# Patient Record
Sex: MALE | Race: WHITE | NOT HISPANIC OR LATINO | Employment: OTHER | URBAN - METROPOLITAN AREA
[De-identification: names, ages, dates, MRNs, and addresses within clinical notes are randomized per-mention and may not be internally consistent; named-entity substitution may affect disease eponyms.]

---

## 2017-09-27 ENCOUNTER — HOSPITAL ENCOUNTER (EMERGENCY)
Facility: HOSPITAL | Age: 82
Discharge: NURSING, REHAB OR LONG-TERM CARE | End: 2017-09-27
Attending: EMERGENCY MEDICINE | Admitting: EMERGENCY MEDICINE
Payer: COMMERCIAL

## 2017-09-27 VITALS
DIASTOLIC BLOOD PRESSURE: 94 MMHG | WEIGHT: 140 LBS | OXYGEN SATURATION: 95 % | TEMPERATURE: 97.9 F | RESPIRATION RATE: 18 BRPM | SYSTOLIC BLOOD PRESSURE: 166 MMHG | HEART RATE: 86 BPM

## 2017-09-27 DIAGNOSIS — F03.90 DEMENTIA (HCC): Primary | ICD-10-CM

## 2017-09-27 LAB
AMPHETAMINES SERPL QL SCN: NEGATIVE
BACTERIA UR QL AUTO: ABNORMAL /HPF
BARBITURATES UR QL: NEGATIVE
BENZODIAZ UR QL: NEGATIVE
BILIRUB UR QL STRIP: ABNORMAL
CAOX CRY URNS QL MICRO: ABNORMAL /HPF
CLARITY UR: CLEAR
COCAINE UR QL: NEGATIVE
COLOR UR: YELLOW
COLOR, POC: YELLOW
GLUCOSE UR STRIP-MCNC: NEGATIVE MG/DL
HGB UR QL STRIP.AUTO: ABNORMAL
HYALINE CASTS #/AREA URNS LPF: ABNORMAL /LPF
KETONES UR STRIP-MCNC: NEGATIVE MG/DL
LEUKOCYTE ESTERASE UR QL STRIP: ABNORMAL
METHADONE UR QL: NEGATIVE
NITRITE UR QL STRIP: NEGATIVE
NON-SQ EPI CELLS URNS QL MICRO: ABNORMAL /HPF
OPIATES UR QL SCN: NEGATIVE
PCP UR QL: NEGATIVE
PH UR STRIP.AUTO: 5.5 [PH] (ref 4.5–8)
PROT UR STRIP-MCNC: ABNORMAL MG/DL
RBC #/AREA URNS AUTO: ABNORMAL /HPF
SP GR UR STRIP.AUTO: >=1.03 (ref 1–1.03)
THC UR QL: NEGATIVE
UROBILINOGEN UR QL STRIP.AUTO: 4 E.U./DL
WBC #/AREA URNS AUTO: ABNORMAL /HPF

## 2017-09-27 PROCEDURE — 80307 DRUG TEST PRSMV CHEM ANLYZR: CPT | Performed by: EMERGENCY MEDICINE

## 2017-09-27 PROCEDURE — 81002 URINALYSIS NONAUTO W/O SCOPE: CPT | Performed by: EMERGENCY MEDICINE

## 2017-09-27 PROCEDURE — 99283 EMERGENCY DEPT VISIT LOW MDM: CPT

## 2017-09-27 PROCEDURE — 81001 URINALYSIS AUTO W/SCOPE: CPT

## 2017-09-27 RX ORDER — MELATONIN
2000 DAILY
COMMUNITY

## 2017-09-27 RX ORDER — FINASTERIDE 5 MG/1
5 TABLET, FILM COATED ORAL DAILY
COMMUNITY

## 2017-09-27 RX ORDER — CHOLECALCIFEROL (VITAMIN D3) 125 MCG
5 CAPSULE ORAL
COMMUNITY

## 2017-09-27 RX ORDER — HALOPERIDOL 0.5 MG/1
0.5 TABLET ORAL ONCE AS NEEDED
COMMUNITY

## 2017-09-27 RX ORDER — ATORVASTATIN CALCIUM 20 MG/1
20 TABLET, FILM COATED ORAL DAILY
COMMUNITY

## 2017-09-27 RX ORDER — SACCHAROMYCES BOULARDII 250 MG
250 CAPSULE ORAL DAILY
COMMUNITY

## 2017-09-27 RX ORDER — TAMSULOSIN HYDROCHLORIDE 0.4 MG/1
0.4 CAPSULE ORAL
COMMUNITY

## 2017-09-27 RX ORDER — ASPIRIN 81 MG/1
81 TABLET ORAL DAILY
COMMUNITY

## 2017-09-27 RX ORDER — MULTIVITAMIN
1 CAPSULE ORAL DAILY
COMMUNITY

## 2017-09-27 RX ORDER — QUETIAPINE FUMARATE 25 MG/1
25 TABLET, FILM COATED ORAL DAILY
COMMUNITY

## 2017-09-27 RX ORDER — ACETAMINOPHEN 325 MG/1
650 TABLET ORAL EVERY 6 HOURS PRN
COMMUNITY

## 2017-09-27 NOTE — ED PROVIDER NOTES
History  Chief Complaint   Patient presents with    Psychiatric Evaluation     Patient comes from locked dementia unit at [facility] and per staff patient has been combative, has been hiding in closets, and entering other patient's rooms; per staff pt  is not redirectable and they would like a staff eval for patient; pt  is unable to answer questions appropriately due to end stage dementia      This is an 75-year-old male with a history of dementia, Alzheimer's, hypertension who presents with violent behavior/altered mental status  The patient is currently alert and oriented x1 (only to himself)  He is not sure why is in the hospital   I spoke with [facility] staff, who state that he was only sent in for altered mental status  According to paperwork, he has been hiding in closet, going into other patient's rooms, and getting violent with the staff  He does come from a locked dementia unit at [facility]  According to the patient's wife, she corroborates the paperwork is states that he does have severe dementia and is usually pleasant at baseline  No other concerning symptoms including fever, complaints of dysuria, diarrhea, cough  The patient is sitting comfortably in the stretcher  He is wide awake and pleasantly confused  Denies fever/chills, nausea/vomiting, lightheadedness/dizziness, numbness/weakness, headache, change in vision, URI symptoms, neck pain, chest pain, palpitations, shortness of breath, cough, back pain, flank pain, abdominal pain, diarrhea, hematochezia, melena, dysuria, hematuria  Prior to Admission Medications   Prescriptions Last Dose Informant Patient Reported? Taking? Melatonin 5 MG TABS   Yes Yes   Sig: Take 5 mg by mouth daily at bedtime   Multiple Vitamin (MULTIVITAMIN) capsule   Yes Yes   Sig: Take 1 capsule by mouth daily   QUEtiapine (SEROquel) 25 mg tablet   Yes Yes   Sig: Take 25 mg by mouth daily Patient takes 1/2 tablet daily     acetaminophen (TYLENOL) 325 mg tablet   Yes Yes   Sig: Take 650 mg by mouth every 6 (six) hours as needed for mild pain   aspirin (ECOTRIN LOW STRENGTH) 81 mg EC tablet   Yes Yes   Sig: Take 81 mg by mouth daily   atorvastatin (LIPITOR) 20 mg tablet   Yes Yes   Sig: Take 20 mg by mouth daily   cholecalciferol (VITAMIN D3) 1,000 units tablet   Yes Yes   Sig: Take 2,000 Units by mouth daily   finasteride (PROSCAR) 5 mg tablet   Yes Yes   Sig: Take 5 mg by mouth daily   haloperidol (HALDOL) 0 5 mg tablet   Yes Yes   Sig: Take 0 5 mg by mouth once as needed for agitation   metoprolol tartrate (LOPRESSOR) 25 mg tablet   Yes Yes   Sig: Take 12 5 mg by mouth every 12 (twelve) hours   saccharomyces boulardii (FLORASTOR) 250 mg capsule   Yes Yes   Sig: Take 250 mg by mouth daily   tamsulosin (FLOMAX) 0 4 mg   Yes Yes   Sig: Take 0 4 mg by mouth daily with dinner      Facility-Administered Medications: None       Past Medical History:   Diagnosis Date    Acute kidney injury     Alzheimer's dementia with behavioral disturbance     BPH (benign prostatic hyperplasia)     Cardiac disease     Dementia     Falls frequently     Hyperlipidemia     Hypertension        History reviewed  No pertinent surgical history  History reviewed  No pertinent family history  I have reviewed and agree with the history as documented  Social History   Substance Use Topics    Smoking status: Unknown If Ever Smoked    Smokeless tobacco: Never Used    Alcohol use No        Review of Systems   Constitutional: Negative for chills, fatigue and fever  HENT: Negative for rhinorrhea, sore throat and trouble swallowing  Eyes: Negative for photophobia and visual disturbance  Respiratory: Negative for cough, chest tightness and shortness of breath  Cardiovascular: Negative for chest pain, palpitations and leg swelling  Gastrointestinal: Negative for abdominal pain, blood in stool, diarrhea, nausea and vomiting  Endocrine: Negative for polyuria  Genitourinary: Negative for dysuria, flank pain and hematuria  Musculoskeletal: Negative for back pain and neck pain  Skin: Negative for color change and rash  Allergic/Immunologic: Negative for immunocompromised state  Neurological: Negative for dizziness, weakness, light-headedness, numbness and headaches  All other systems reviewed and are negative  Physical Exam  ED Triage Vitals [09/27/17 1351]   Temperature Pulse Respirations Blood Pressure SpO2   97 9 °F (36 6 °C) 75 18 131/67 93 %      Temp Source Heart Rate Source Patient Position - Orthostatic VS BP Location FiO2 (%)   Oral Monitor Sitting Right arm --      Pain Score       No Pain           Physical Exam   Constitutional: Vital signs are normal  He appears well-developed and well-nourished  He is cooperative  No distress  HENT:   Mouth/Throat: Uvula is midline and oropharynx is clear and moist    Eyes: Conjunctivae, EOM and lids are normal  Pupils are equal, round, and reactive to light  Neck: Trachea normal  No thyroid mass and no thyromegaly present  Cardiovascular: Normal rate, regular rhythm, normal heart sounds, intact distal pulses and normal pulses  No murmur heard  Pulmonary/Chest: Effort normal and breath sounds normal    Abdominal: Soft  Normal appearance and bowel sounds are normal  There is no tenderness  There is no rebound, no guarding, no CVA tenderness and negative Mueller's sign  Neurological: He is alert  Alert and oriented x1 (to person)   Skin: Skin is warm, dry and intact  Psychiatric: He has a normal mood and affect   His speech is normal and behavior is normal  Thought content normal        ED Medications  Medications - No data to display    Diagnostic Studies  Labs Reviewed   URINE MICROSCOPIC - Abnormal        Result Value Ref Range Status    RBC, UA 2-4 (*) None Seen, 0-5 /hpf Final    WBC, UA 4-10 (*) None Seen, 0-5, 5-55, 5-65 /hpf Final    Hyaline Casts, UA 3-5 (*) None Seen /lpf Final    Ca Oxalate Renae, UA Moderate (*) None Seen /hpf Final    Epithelial Cells None Seen  None Seen, Occasional /hpf Final    Bacteria, UA Occasional  None Seen, Occasional /hpf Final   ED URINE MACROSCOPIC - Abnormal     Leukocytes, UA Trace (*) Negative Final    Protein,  (2+) (*) Negative mg/dl Final    Urobilinogen, UA 4 0 (*) 0 2, 1 0 E U /dl E U /dl Final    Bilirubin, UA Interference- unable to analyze (*) Negative Final    Comment: The dipstick result may be falsely positive do to interfering substances  We recommend reliance upon serum bilirubin, liver & kidney function tests to guide patient care if clinically indicated  Blood, UA Trace (*) Negative Final    Color, UA Yellow   Final    Clarity, UA Clear   Final    pH, UA 5 5  4 5 - 8 0 Final    Nitrite, UA Negative  Negative Final    Glucose, UA Negative  Negative mg/dl Final    Ketones, UA Negative  Negative mg/dl Final    Specific Gravity, UA >=1 030  1 003 - 1 030 Final    Narrative:     CLINITEK RESULT   RAPID DRUG SCREEN, URINE - Normal    Amph/Meth UR Negative  Negative Final    Barbiturate Ur Negative  Negative Final    Benzodiazepine Urine Negative  Negative Final    Cocaine Urine Negative  Negative Final    Methadone Urine Negative  Negative Final    Opiate Urine Negative  Negative Final    PCP Ur Negative  Negative Final    THC Urine Negative  Negative Final    Narrative:     FOR MEDICAL PURPOSES ONLY  IF CONFIRMATION NEEDED PLEASE CONTACT THE LAB WITHIN 5 DAYS      Drug Screen Cutoff Levels:  AMPHETAMINE/METHAMPHETAMINES  1000 ng/mL  BARBITURATES     200 ng/mL  BENZODIAZEPINES     200 ng/mL  COCAINE      300 ng/mL  METHADONE      300 ng/mL  OPIATES      300 ng/mL  PHENCYCLIDINE     25 ng/mL  THC       50 ng/mL   POCT URINALYSIS DIPSTICK - Normal    Color, UA yellow   Final       No orders to display       Procedures  Procedures      Phone Consults  ED Phone Contact    ED Course  ED Course                                MDM  Number of Diagnoses or Management Options  Dementia:   Diagnosis management comments: The patient is well-appearing and in no distress  He is at his baseline mental status  He is pleasant and non combative  We will discharge the patient back to the nursing home  CritCare Time    Disposition  Final diagnoses:   Dementia     ED Disposition     ED Disposition Condition Comment    Discharge  [facility]ron Sharp discharge to home/self care  Condition at discharge: Stable        Follow-up Information     Follow up With Specialties Details Why Contact Info Additional [address] Emergency Department Emergency Medicine Go to If symptoms worsen [address]  [phone]  ED, [address] [place] [address], [phone]    Primary Care Doctor    Follow up PCP as needed  Discharge Medication List as of 9/27/2017  5:22 PM      CONTINUE these medications which have NOT CHANGED    Details   acetaminophen (TYLENOL) 325 mg tablet Take 650 mg by mouth every 6 (six) hours as needed for mild pain, Historical Med      aspirin (ECOTRIN LOW STRENGTH) 81 mg EC tablet Take 81 mg by mouth daily, Historical Med      atorvastatin (LIPITOR) 20 mg tablet Take 20 mg by mouth daily, Historical Med      cholecalciferol (VITAMIN D3) 1,000 units tablet Take 2,000 Units by mouth daily, Historical Med      finasteride (PROSCAR) 5 mg tablet Take 5 mg by mouth daily, Historical Med      haloperidol (HALDOL) 0 5 mg tablet Take 0 5 mg by mouth once as needed for agitation, Historical Med      Melatonin 5 MG TABS Take 5 mg by mouth daily at bedtime, Historical Med      metoprolol tartrate (LOPRESSOR) 25 mg tablet Take 12 5 mg by mouth every 12 (twelve) hours, Historical Med      Multiple Vitamin (MULTIVITAMIN) capsule Take 1 capsule by mouth daily, Historical Med      QUEtiapine (SEROquel) 25 mg tablet Take 25 mg by mouth daily Patient takes 1/2 tablet daily  , Historical Med      saccharomyces boulardii (FLORASTOR) 250 mg capsule Take 250 mg by mouth daily, Historical Med      tamsulosin (FLOMAX) 0 4 mg Take 0 4 mg by mouth daily with dinner, Historical Med           No discharge procedures on file  ED Provider  Attending physically available and evaluated Ericka Forte I managed the patient along with the ED Attending      Electronically Signed by       Jessica Chnaey  Resident  09/28/17 2495

## 2017-09-27 NOTE — ED ATTENDING ATTESTATION
Siva Clemons DO, saw and evaluated the patient  I have discussed the patient with the resident/non-physician practitioner and agree with the resident's/non-physician practitioner's findings, Plan of Care, and MDM as documented in the resident's/non-physician practitioner's note, except where noted  All available labs and Radiology studies were reviewed  At this point I agree with the current assessment done in the Emergency Department  I have conducted an independent evaluation of this patient a history and physical is as follows:      79 yo male with hx of severe dementia presents from [address] [place] [address] locked dementia unit, apparently for agitation, he has been reportedly going into other resident's rooms and hiding in the closet  He apparently was agitated and they gave him a small dose of haldol which didn't resolve his symptoms so he was sent to the ED  Hx is not available from pt at this time due to advanced dementia  Imp: dementia, agitation   Plan: d/w [address] [place] [address] staff to verify hx  tom-psych eval       Critical Care Time  CritCare Time

## 2017-09-27 NOTE — DISCHARGE INSTRUCTIONS
Dementia   WHAT YOU NEED TO KNOW:   Dementia is a condition that causes loss of memory, thought control, and judgment  Alzheimer disease is the most common cause of dementia  Other common causes are loss of blood flow or nerve damage in the brain, and long-term alcohol or drug use  Dementia cannot be cured or prevented, but treatment may slow or reduce your symptoms  DISCHARGE INSTRUCTIONS:   Return to the emergency department if  you or someone close to you notices:  · You have signs of delirium, such as extreme confusion, and seeing or hearing things that are not there  · You become angry or violent, and cannot be calmed down  · You faint and cannot be woken  Contact your healthcare provider if  you or someone close to you notices:  · You have a fever  · You have increased confusion, behavior, or mood changes  · You have questions or concerns about your condition or care  Medicines: You may need any of the following:  · Antianxiety medicine  may be used to help reduce anxiety and keep you calm  · Antipsychotics  may be used to help control any anger or violence  · Antidepressants  may be used to help improve your mood and reduce your symptoms of depression  · Take your medicine as directed  Contact your healthcare provider if you think your medicine is not helping or if you have side effects  Tell him of her if you are allergic to any medicine  Keep a list of the medicines, vitamins, and herbs you take  Include the amounts, and when and why you take them  Bring the list or the pill bottles to follow-up visits  Carry your medicine list with you in case of an emergency  Follow up with your healthcare provider as directed:  Write down your questions so you remember to ask them during your visits  Ask someone to go in with you if you have trouble understanding or remembering what your healthcare provider tells you   The person can take notes for you during the visit and go over the notes with you later  Manage your dementia:  You may begin to need an in-home aide to help you remember your daily tasks  Ask your healthcare provider for a list of organizations that can help  It is best to arrange for help while you are thinking clearly  The following may also help you manage your dementia:  · Keep your mind and body active  Do activities that you love, such as art, gardening, or listening to music  Call or visit people often  This will keep your social skills sharp, and may help reduce depression  · Take all of your medicines as directed  This will help control medical conditions, such as high blood pressure, high cholesterol, and diabetes  · Write daily schedules and routines  Record doctor appointments, times to take your medicines, meal times, or any other things to remember  Write down reminders to use the bathroom if you have trouble remembering  You may to ask someone to write things down for you  · Place clocks and calendars where you can see them  This will help you remember appointments and tasks  · Do not smoke  Nicotine and other chemicals in cigarettes and cigars can cause lung damage  Ask your healthcare provider for information if you currently smoke and need help to quit  E-cigarettes or smokeless tobacco still contain nicotine  Talk to your healthcare provider before you use these products  · Eat healthy foods  Examples are fruits, vegetables, whole-grain breads, low-fat dairy, beans, lean meats, and fish  Ask if you need to be on a special diet  © 2017 [address] [employer] Information is for End User's use only and may not be sold, redistributed or otherwise used for commercial purposes  All illustrations and images included in CareNotes® are the copyrighted property of [employer] , [employer]  or Michael Ferrera  The above information is an  only  It is not intended as medical advice for individual conditions or treatments   Talk to your doctor, nurse or pharmacist before following any medical regimen to see if it is safe and effective for you

## 2018-01-16 NOTE — PROGRESS NOTES
Chief Complaint  Patient here today for a 2 week BP check  Meds reviewed and updated  Patient states he is feeling well, and has no complaints today  History of Present Illness  HPI: BP check, still high normal and today   Start losartan 25 per day  Home BP checks/machine recommended  Return a sscheduled      Active Problems    1  2-vessel coronary artery disease (414 00) (I25 10)   2  Chest pain (786 50) (R07 9)   3  Dizziness (780 4) (R42)   4  Hyperlipidemia (272 4) (E78 5)   5  Hypertension (401 9) (I10)   6  Memory loss (780 93) (R41 3)   7  Orthostatic hypotension (458 0) (I95 1)    Current Meds   1  Aspirin 81 MG Oral Tablet; Take 1 tablet daily; Therapy: [id number] to (Evaluate:09Nov2013) Recorded   2  Atorvastatin Calcium 20 MG Oral Tablet; TAKE 1 TABLET DAILY AT BEDTIME; Therapy: [id number] to (Fernandez Bilpatel)  Requested for: [id number]; Last   Rx:09Mar2015 Ordered   3  Finasteride 5 MG Oral Tablet; TAKE 1 TABLET DAILY; Therapy: (Recorded:05Jan2016) to Recorded   4  Metoprolol Tartrate 25 MG Oral Tablet; TAKE 0 5 TABLET Every twelve hours; Therapy: [id number] to (Lillie Medina)  Requested for: [id number]; Last   Rx:[id number] Ordered    Allergies    1  No Known Drug Allergies    Vitals  Signs [Data Includes: Current Encounter]    Heart Rate: 64, R Radial  Systolic: 462, LUE, Sitting  Diastolic: 70, LUE, Sitting  Height: 5 ft 9 in  Weight: 135 lb 8 oz  BMI Calculated: 20 01  BSA Calculated: 1 75    Assessment    1  Hypertension (401 9) (I10)   2  2-vessel coronary artery disease (414 00) (I25 10)    Plan  Hypertension    · Losartan Potassium 25 MG Oral Tablet; Take 1 tablet daily as directed    Discussion/Summary    See above  Signatures   Electronically signed by : DIONNE Bauman ; Jan 21 2016 10:40AM EST                       (Author)